# Patient Record
Sex: MALE | Race: WHITE
[De-identification: names, ages, dates, MRNs, and addresses within clinical notes are randomized per-mention and may not be internally consistent; named-entity substitution may affect disease eponyms.]

---

## 2018-01-17 ENCOUNTER — HOSPITAL ENCOUNTER (EMERGENCY)
Dept: HOSPITAL 62 - ER | Age: 73
Discharge: HOME | End: 2018-01-17
Payer: OTHER GOVERNMENT

## 2018-01-17 VITALS — DIASTOLIC BLOOD PRESSURE: 55 MMHG | SYSTOLIC BLOOD PRESSURE: 126 MMHG

## 2018-01-17 DIAGNOSIS — J45.909: ICD-10-CM

## 2018-01-17 DIAGNOSIS — I10: ICD-10-CM

## 2018-01-17 DIAGNOSIS — M50.322: Primary | ICD-10-CM

## 2018-01-17 DIAGNOSIS — R20.2: ICD-10-CM

## 2018-01-17 DIAGNOSIS — R20.0: ICD-10-CM

## 2018-01-17 DIAGNOSIS — Z88.8: ICD-10-CM

## 2018-01-17 DIAGNOSIS — Z86.73: ICD-10-CM

## 2018-01-17 DIAGNOSIS — I25.10: ICD-10-CM

## 2018-01-17 DIAGNOSIS — E11.9: ICD-10-CM

## 2018-01-17 PROCEDURE — 96372 THER/PROPH/DIAG INJ SC/IM: CPT

## 2018-01-17 PROCEDURE — 99284 EMERGENCY DEPT VISIT MOD MDM: CPT

## 2018-01-17 PROCEDURE — 72141 MRI NECK SPINE W/O DYE: CPT

## 2018-01-17 NOTE — ER DOCUMENT REPORT
ED General





- General


Chief Complaint: Neck Pain >24hrs old


Stated Complaint: NECK PAIN


Time Seen by Provider: 01/17/18 09:40


Notes: 





Patient is a 72-year-old male who presents emergency department [facility] 

[facility] with a chief complaint of left neck pain with associated numbness 

and tingling in his left arm.  Patient states that he has a history of multiple 

strokes as well as diabetes and high blood pressure.  Patient states for the 

past 2 days he has had this neck pain that hurts worse with movement he was 

tender to touch of the left trapezius.  States he has not done anything for it 

at home.  [facility] and [facility] referred him here for rule 

out stroke.  He denies any weakness, difficulty swallowing, difficulty talking, 

difficulty breathing, vision changes, headache, gait abnormalities.


TRAVEL OUTSIDE OF THE [place] IN LAST 30 DAYS: No





- Related Data


Allergies/Adverse Reactions: 


 





ACE Inhibitors [Ace Inhibitors] Allergy (Severe, Verified 02/20/15 16:25)


 











Past Medical History





- Social History


Smoking Status: Never Smoker


Chew tobacco use (# tins/day): No


Frequency of alcohol use: None


Drug Abuse: None


Family History: Reviewed & Not Pertinent


Patient has suicidal ideation: No


Patient has homicidal ideation: No





- Past Medical History


Cardiac Medical History: Reports: Hx Coronary Artery Disease, Hx 

Hypercholesterolemia, Hx Hypertension, Hx Heart Murmur


   Denies: Hx Heart Attack


Pulmonary Medical History: Reports: Hx Asthma, Hx Sleep Apnea, Hx Tuberculosis


Neurological Medical History: Reports: Hx Cerebrovascular Accident - x2, with 

left sided weakness


Endocrine Medical History: Reports: Hx Diabetes Mellitus Type 1, Hx Diabetes 

Mellitus Type 2


Renal/ Medical History: Reports: Hx Benign Prostatic Hyperplasia.  Denies: Hx 

Peritoneal Dialysis


Musculoskeltal Medical History: Reports Hx Arthritis - osteoarthritis, Reports 

Hx Gout


Psychiatric Medical History: Reports: Hx Depression, Hx Post Traumatic Stress 

Disorder


Past Surgical History: Reports: Hx Cholecystectomy, Hx Orthopedic Surgery - 

lumbar fusion 10-7-2014, right knee.  Denies: Hx Pacemaker





- Immunizations


Hx Diphtheria, Pertussis, Tetanus Vaccination: Yes


Hx Pneumococcal Vaccination: 10/01/11





Review of Systems





- Review of Systems


Constitutional: No symptoms reported


Cardiovascular: No symptoms reported


Respiratory: No symptoms reported


Gastrointestinal: No symptoms reported


Musculoskeletal: See HPI


Neurological/Psychological: See HPI


-: Yes All other systems reviewed and negative





Physical Exam





- Vital signs


Vitals: 


 











Temp Pulse Resp BP Pulse Ox


 


 98.2 F   81   19   125/60   98 


 


 01/17/18 09:25  01/17/18 09:25  01/17/18 09:25  01/17/18 09:25  01/17/18 09:25














- Notes


Notes: 





PHYSICAL EXAM


GENERAL: Alert, interacts well. 


HEAD: Normocephalic, atraumatic.


EYES: Pupils equal, round, and reactive to light. Extraocular movements intact.


ENT: Oral mucosa moist, tongue midline. 


NECK: Pain with range of motion worse on the left tenderness to palpation of 

the left trapezius with pain reproducible to palpation.  Supple. Trachea 

midline.


LUNGS: Clear to auscultation bilaterally, no wheezes, rales, or rhonchi. No 

respiratory distress.


HEART: Regular rate and rhythm. No murmurs, gallops, or rubs.


EXTREMITIES: Moves all 4 extremities spontaneously. No edema, radial and 

dorsalis pedis pulses 2/4 bilaterally. No cyanosis. 


NEUROLOGICAL: Alert and oriented x4. Face symmetric.  Tongue protrudes midline.

  Extraocular motions intact.  Pupils are 2 mm and equally reactive.  Normal 

speech, normal gait.  5 out of 5 strength in both the distal and proximal upper 

and lower extremities bilaterally.  Sensation is grossly intact throughout.  

Finger to nose testing normal.  Pronator drift normal.


PSYCH: Normal affect, normal mood.


SKIN: Warm, dry, normal turgor. No rashes or lesions noted.








Course





- Re-evaluation


Re-evalutation: 





01/17/18 13:28


Patient is a 72-year-old male is hemodynamically stable, no acute distress and 

afebrile.  MRI was ordered in triage that shows evidence of severe disc and 

degenerative disease which is likely causing the tingling in his left upper 

extremity.  Otherwise symptoms are consistent with associated muscle strain.  

Patient states that his neck feels much better after Valium, Toradol and 

Decadron.  ROM intact with minimal pain.  After performing a Medical Screening 

Examination, I estimate there is LOW risk for CENTRAL CORD SYNDROME, EPIDURAL 

MASS LESION, SEVERE SPINAL STENOSIS, ARTERIAL DISSECTION, MENINGITIS, or ACUTE 

CORONARY SYNDROME, thus I consider the discharge disposition reasonable.  I 

have reevaluated this patient multiple times and no significant life 

threatening changes are noted. The patient and I have discussed the diagnosis 

and risks, and we agree with discharging home to follow-up on an outpatient 

basis with the understanding that symptoms and presentations can change. We 

also discussed returning to the Emergency Department immediately if new or 

worsening symptoms occur. We have discussed the symptoms which are most 

concerning (e.g., saddle anesthesia, urinary or bowel incontinence or retention

, changing or worsening pain) that necessitate immediate return.








- Vital Signs


Vital signs: 


 











Temp Pulse Resp BP Pulse Ox


 


 98.2 F   81   19   125/60   98 


 


 01/17/18 09:25  01/17/18 09:25  01/17/18 09:25  01/17/18 09:25  01/17/18 09:25














- Diagnostic Test


Radiology reviewed: Image reviewed, Reports reviewed





Discharge





- Discharge


Clinical Impression: 


 Neck pain on left side





Condition: Good


Disposition: HOME, SELF-CARE


Additional Instructions: 


Your symptoms today are consistent with a muscle strain that is gotten worse 

over the past couple of days.  Your MRI shows evidence of underlying 

degenerative changes and disc disease compressing any nerves causing numbness 

and tingling in her left arm.  [facility] 

provided for you today and associated disc for possible referral for surgery.  

Please return to the emergency department with any worsening symptoms, weakness

, difficulty swallowing, vision changes or any other symptoms worrisome to you


Prescriptions: 


Diazepam [Valium 5 mg Tablet] 5 mg PO QIDP PRN #15 tablet


 PRN Reason: 


Referrals: 


[facility] [Provider Group] - Follow up tomorrow

## 2018-01-17 NOTE — RADIOLOGY REPORT (SQ)
EXAM DESCRIPTION:  MRI CERVICAL SPINE WITHOUT



COMPLETED DATE/TIME:  1/17/2018 10:35 am



REASON FOR STUDY:  LUE radiculopathy



COMPARISON:  None.



TECHNIQUE:  Sagittal and Axial imaging includes T1, T2, STIR and gradient echo sequences.



LIMITATIONS:  None.



FINDINGS:  ALIGNMENT: 3 mm anterior displacement of C4 on C5 which appears to be due to degenerative 
changes involving the facet joints.  There is some fragmentation of the lower right facet at C4 witho
ut edema raising the possibility of old injury.  There is no evidence of acute fracture.

VERTEBRAE: Intact.

BONE MARROW: Normal. No marrow replacement or reactive changes.

DISCS: Severe disc space loss at C5-6 with moderate disc space loss at C6-7.  At C3-4, there is mild 
generalized disc bulging with focally greater disc protrusion osteophyte formation posterolaterally o
n the left.  At C4-5, there is mild generalized disc bulging with a small central disc extrusion and 
small posterolateral os.  At C5-6, there is moderate size broad-based disc extrusion with associated 
osteophytes most prominently on the right posterolaterally.  At C6-7, there is small broad-based disc
 extrusion most prominently centrally with small osteophytes.  At C7-T1, there is minimal posterolate
ral disc osteophyte complex.

Facet joints:  Mild to moderate degenerative changes.

HARDWARE: None in the spine.

CORD AND BASE OF BRAIN: Normal in size and signal intensity.

SOFT TISSUES: No soft tissue masses.

C1-C2: No significant spinal stenosis.

C2-C3: No significant spinal stenosis or exit foraminal stenosis.

C3-C4: No significant spinal stenosis.  Severe left neural foraminal narrowing.  No significant narro
wing on the right.

C4-C5: No significant spinal stenosis.  Severe narrowing of the upper aspect of the foramina bilatera
lly with mild narrowing inferiorly.

C5-C6: Mild spinal stenosis on the right.  Severe right neural foraminal narrowing.  Mild left neural
 foraminal narrowing.

C6-C7: No significant spinal stenosis.  Mild bilateral neural foraminal narrowing.

C7-T1: No significant spinal stenosis or exit foraminal stenosis.

UPPER THORACIC: Incompletely imaged. No significant spinal stenosis or exit foraminal stenosis.

OTHER: No other significant finding.



IMPRESSION:  Multilevel degenerative disc disease and facet disease with mild spinal stenosis at C5-6
 and multilevel neural foraminal narrowing as described.



TECHNICAL DOCUMENTATION:  JOB ID:  [id number]

 2011 [employer] [facility] [employer]- All Rights Reserved

## 2018-01-17 NOTE — ER DOCUMENT REPORT
ED Medical Screen (RME)





- General


Chief Complaint: Neck Pain >24hrs old


Stated Complaint: NECK PAIN


Time Seen by Provider: 01/17/18 09:40


Notes: 





72-year-old male patient sent to the emergency room for evaluation of severe 

left-sided neck shoulder and arm pain and numbness.


Brief exam suggest a cervical radiculopathy.





I have greeted and performed a rapid initial assessment of this patient.  A 

comprehensive ED assessment and evaluation of the patient, analysis of test 

results and completion of the medical decision making process will be conducted 

by additional ED providers.


TRAVEL OUTSIDE OF THE [place] IN LAST 30 DAYS: No





- Related Data


Allergies/Adverse Reactions: 


 





ACE Inhibitors [Ace Inhibitors] Allergy (Severe, Verified 02/20/15 16:25)


 











Past Medical History





- Social History


Chew tobacco use (# tins/day): No


Frequency of alcohol use: None


Drug Abuse: None





- Past Medical History


Cardiac Medical History: Reports: Hx Coronary Artery Disease, Hx 

Hypercholesterolemia, Hx Hypertension, Hx Heart Murmur


   Denies: Hx Heart Attack


Pulmonary Medical History: Reports: Hx Asthma, Hx Sleep Apnea, Hx Tuberculosis


Neurological Medical History: Reports: Hx Cerebrovascular Accident - x2, with 

left sided weakness


Endocrine Medical History: Reports: Hx Diabetes Mellitus Type 1, Hx Diabetes 

Mellitus Type 2


Renal/ Medical History: Reports: Hx Benign Prostatic Hyperplasia.  Denies: Hx 

Peritoneal Dialysis


Musculoskeltal Medical History: Reports Hx Arthritis - osteoarthritis, Reports 

Hx Gout


Psychiatric Medical History: Reports: Hx Depression, Hx Post Traumatic Stress 

Disorder


Past Surgical History: Reports: Hx Cholecystectomy, Hx Orthopedic Surgery - 

lumbar fusion 10-7-2014.  Denies: Hx Pacemaker





- Immunizations


Hx Diphtheria, Pertussis, Tetanus Vaccination: Yes





Physical Exam





- Vital signs


Vitals: 





 











Temp Pulse Resp BP Pulse Ox


 


 98.2 F   81   19   125/60   98 


 


 01/17/18 09:25  01/17/18 09:25  01/17/18 09:25  01/17/18 09:25  01/17/18 09:25














Course





- Vital Signs


Vital signs: 





 











Temp Pulse Resp BP Pulse Ox


 


 98.2 F   81   19   125/60   98 


 


 01/17/18 09:25  01/17/18 09:25  01/17/18 09:25  01/17/18 09:25  01/17/18 09:25